# Patient Record
Sex: MALE
[De-identification: names, ages, dates, MRNs, and addresses within clinical notes are randomized per-mention and may not be internally consistent; named-entity substitution may affect disease eponyms.]

---

## 2018-01-26 ENCOUNTER — TRANSCRIPTION ENCOUNTER (OUTPATIENT)
Age: 68
End: 2018-01-26

## 2018-02-18 ENCOUNTER — TRANSCRIPTION ENCOUNTER (OUTPATIENT)
Age: 68
End: 2018-02-18

## 2019-06-16 ENCOUNTER — TRANSCRIPTION ENCOUNTER (OUTPATIENT)
Age: 69
End: 2019-06-16

## 2025-01-28 RX ORDER — CEFUROXIME AXETIL 500 MG
1 TABLET ORAL
Refills: 0 | DISCHARGE
Start: 2025-01-28 | End: 2025-02-01

## 2025-02-05 ENCOUNTER — EMERGENCY (EMERGENCY)
Facility: HOSPITAL | Age: 75
LOS: 0 days | Discharge: HOME | End: 2025-02-05
Attending: EMERGENCY MEDICINE

## 2025-02-05 ENCOUNTER — INPATIENT (INPATIENT)
Facility: HOSPITAL | Age: 75
LOS: 1 days | Discharge: HOME | DRG: 696 | End: 2025-02-07
Attending: FAMILY MEDICINE | Admitting: FAMILY MEDICINE
Payer: MEDICARE

## 2025-02-05 VITALS
OXYGEN SATURATION: 100 % | TEMPERATURE: 98 F | SYSTOLIC BLOOD PRESSURE: 109 MMHG | RESPIRATION RATE: 20 BRPM | DIASTOLIC BLOOD PRESSURE: 64 MMHG | HEIGHT: 66 IN | HEART RATE: 73 BPM

## 2025-02-05 VITALS — DIASTOLIC BLOOD PRESSURE: 66 MMHG | HEART RATE: 80 BPM | SYSTOLIC BLOOD PRESSURE: 119 MMHG

## 2025-02-05 VITALS
OXYGEN SATURATION: 97 % | TEMPERATURE: 97 F | SYSTOLIC BLOOD PRESSURE: 145 MMHG | RESPIRATION RATE: 18 BRPM | HEIGHT: 66 IN | WEIGHT: 141.98 LBS | HEART RATE: 96 BPM | DIASTOLIC BLOOD PRESSURE: 79 MMHG

## 2025-02-05 DIAGNOSIS — E78.5 HYPERLIPIDEMIA, UNSPECIFIED: ICD-10-CM

## 2025-02-05 DIAGNOSIS — R31.9 HEMATURIA, UNSPECIFIED: ICD-10-CM

## 2025-02-05 DIAGNOSIS — R33.9 RETENTION OF URINE, UNSPECIFIED: ICD-10-CM

## 2025-02-05 DIAGNOSIS — N40.1 BENIGN PROSTATIC HYPERPLASIA WITH LOWER URINARY TRACT SYMPTOMS: ICD-10-CM

## 2025-02-05 DIAGNOSIS — R33.8 OTHER RETENTION OF URINE: ICD-10-CM

## 2025-02-05 DIAGNOSIS — Z88.2 ALLERGY STATUS TO SULFONAMIDES: ICD-10-CM

## 2025-02-05 DIAGNOSIS — Z29.9 ENCOUNTER FOR PROPHYLACTIC MEASURES, UNSPECIFIED: ICD-10-CM

## 2025-02-05 DIAGNOSIS — R10.2 PELVIC AND PERINEAL PAIN: ICD-10-CM

## 2025-02-05 LAB
ALBUMIN SERPL ELPH-MCNC: 3.6 G/DL — SIGNIFICANT CHANGE UP (ref 3.3–5)
ALP SERPL-CCNC: 59 U/L — SIGNIFICANT CHANGE UP (ref 40–120)
ALT FLD-CCNC: 25 U/L — SIGNIFICANT CHANGE UP (ref 12–78)
ANION GAP SERPL CALC-SCNC: 4 MMOL/L — LOW (ref 5–17)
ANISOCYTOSIS BLD QL: SLIGHT — SIGNIFICANT CHANGE UP
APPEARANCE UR: ABNORMAL
APPEARANCE UR: ABNORMAL
APTT BLD: 24 SEC — LOW (ref 24.5–35.6)
AST SERPL-CCNC: 22 U/L — SIGNIFICANT CHANGE UP (ref 15–37)
BACTERIA # UR AUTO: ABNORMAL /HPF
BACTERIA # UR AUTO: NEGATIVE /HPF — SIGNIFICANT CHANGE UP
BASOPHILS # BLD AUTO: 0 K/UL — SIGNIFICANT CHANGE UP (ref 0–0.2)
BASOPHILS NFR BLD AUTO: 0 % — SIGNIFICANT CHANGE UP (ref 0–2)
BILIRUB SERPL-MCNC: 0.5 MG/DL — SIGNIFICANT CHANGE UP (ref 0.2–1.2)
BILIRUB UR-MCNC: SIGNIFICANT CHANGE UP
BILIRUB UR-MCNC: SIGNIFICANT CHANGE UP
BLD GP AB SCN SERPL QL: SIGNIFICANT CHANGE UP
BUN SERPL-MCNC: 20 MG/DL — SIGNIFICANT CHANGE UP (ref 7–23)
CALCIUM SERPL-MCNC: 9.3 MG/DL — SIGNIFICANT CHANGE UP (ref 8.5–10.1)
CHLORIDE SERPL-SCNC: 99 MMOL/L — SIGNIFICANT CHANGE UP (ref 96–108)
CO2 SERPL-SCNC: 26 MMOL/L — SIGNIFICANT CHANGE UP (ref 22–31)
COLOR SPEC: ABNORMAL
COLOR SPEC: ABNORMAL
CREAT SERPL-MCNC: 0.93 MG/DL — SIGNIFICANT CHANGE UP (ref 0.5–1.3)
DIFF PNL FLD: SIGNIFICANT CHANGE UP
DIFF PNL FLD: SIGNIFICANT CHANGE UP
EGFR: 86 ML/MIN/1.73M2 — SIGNIFICANT CHANGE UP
EOSINOPHIL # BLD AUTO: 0 K/UL — SIGNIFICANT CHANGE UP (ref 0–0.5)
EOSINOPHIL NFR BLD AUTO: 0 % — SIGNIFICANT CHANGE UP (ref 0–6)
EPI CELLS # UR: SIGNIFICANT CHANGE UP
FLUAV AG NPH QL: SIGNIFICANT CHANGE UP
FLUBV AG NPH QL: SIGNIFICANT CHANGE UP
GLUCOSE BLDC GLUCOMTR-MCNC: 159 MG/DL — HIGH (ref 70–99)
GLUCOSE SERPL-MCNC: 235 MG/DL — HIGH (ref 70–99)
GLUCOSE UR QL: SIGNIFICANT CHANGE UP
GLUCOSE UR QL: SIGNIFICANT CHANGE UP
HCT VFR BLD CALC: 32.9 % — LOW (ref 39–50)
HGB BLD-MCNC: 11.3 G/DL — LOW (ref 13–17)
HYALINE CASTS # UR AUTO: SIGNIFICANT CHANGE UP
HYALINE CASTS # UR AUTO: SIGNIFICANT CHANGE UP
INR BLD: 1.11 RATIO — SIGNIFICANT CHANGE UP (ref 0.85–1.16)
KETONES UR-MCNC: SIGNIFICANT CHANGE UP
KETONES UR-MCNC: SIGNIFICANT CHANGE UP
LACTATE SERPL-SCNC: 1.8 MMOL/L — SIGNIFICANT CHANGE UP (ref 0.7–2)
LACTATE SERPL-SCNC: 2.7 MMOL/L — HIGH (ref 0.7–2)
LEUKOCYTE ESTERASE UR-ACNC: SIGNIFICANT CHANGE UP
LEUKOCYTE ESTERASE UR-ACNC: SIGNIFICANT CHANGE UP
LYMPHOCYTES # BLD AUTO: 0.93 K/UL — LOW (ref 1–3.3)
LYMPHOCYTES # BLD AUTO: 7 % — LOW (ref 13–44)
MACROCYTES BLD QL: SLIGHT — SIGNIFICANT CHANGE UP
MANUAL SMEAR VERIFICATION: SIGNIFICANT CHANGE UP
MCHC RBC-ENTMCNC: 33 PG — SIGNIFICANT CHANGE UP (ref 27–34)
MCHC RBC-ENTMCNC: 34.3 G/DL — SIGNIFICANT CHANGE UP (ref 32–36)
MCV RBC AUTO: 96.2 FL — SIGNIFICANT CHANGE UP (ref 80–100)
MONOCYTES # BLD AUTO: 0.53 K/UL — SIGNIFICANT CHANGE UP (ref 0–0.9)
MONOCYTES NFR BLD AUTO: 4 % — SIGNIFICANT CHANGE UP (ref 2–14)
NEUTROPHILS # BLD AUTO: 11.85 K/UL — HIGH (ref 1.8–7.4)
NEUTROPHILS NFR BLD AUTO: 89 % — HIGH (ref 43–77)
NITRITE UR-MCNC: SIGNIFICANT CHANGE UP
NITRITE UR-MCNC: SIGNIFICANT CHANGE UP
NRBC # BLD: 0 /100 WBCS — SIGNIFICANT CHANGE UP (ref 0–0)
NRBC # BLD: SIGNIFICANT CHANGE UP /100 WBCS (ref 0–0)
NRBC BLD-RTO: 0 /100 WBCS — SIGNIFICANT CHANGE UP (ref 0–0)
NRBC BLD-RTO: SIGNIFICANT CHANGE UP /100 WBCS (ref 0–0)
PH UR: SIGNIFICANT CHANGE UP (ref 5–8)
PH UR: SIGNIFICANT CHANGE UP (ref 5–8)
PLAT MORPH BLD: NORMAL — SIGNIFICANT CHANGE UP
PLATELET # BLD AUTO: 160 K/UL — SIGNIFICANT CHANGE UP (ref 150–400)
POTASSIUM SERPL-MCNC: 3.8 MMOL/L — SIGNIFICANT CHANGE UP (ref 3.5–5.3)
POTASSIUM SERPL-SCNC: 3.8 MMOL/L — SIGNIFICANT CHANGE UP (ref 3.5–5.3)
PROT SERPL-MCNC: 6.4 GM/DL — SIGNIFICANT CHANGE UP (ref 6–8.3)
PROT UR-MCNC: SIGNIFICANT CHANGE UP
PROT UR-MCNC: SIGNIFICANT CHANGE UP
PROTHROM AB SERPL-ACNC: 12.8 SEC — SIGNIFICANT CHANGE UP (ref 9.9–13.4)
RBC # BLD: 3.42 M/UL — LOW (ref 4.2–5.8)
RBC # FLD: 12.8 % — SIGNIFICANT CHANGE UP (ref 10.3–14.5)
RBC BLD AUTO: ABNORMAL
RBC CASTS # UR COMP ASSIST: >50 /HPF — HIGH (ref 0–4)
RBC CASTS # UR COMP ASSIST: ABNORMAL /HPF
RSV RNA NPH QL NAA+NON-PROBE: SIGNIFICANT CHANGE UP
SARS-COV-2 RNA SPEC QL NAA+PROBE: SIGNIFICANT CHANGE UP
SODIUM SERPL-SCNC: 129 MMOL/L — LOW (ref 135–145)
SP GR SPEC: SIGNIFICANT CHANGE UP (ref 1–1.03)
SP GR SPEC: SIGNIFICANT CHANGE UP (ref 1–1.03)
UROBILINOGEN FLD QL: SIGNIFICANT CHANGE UP (ref 0.2–1)
UROBILINOGEN FLD QL: SIGNIFICANT CHANGE UP (ref 0.2–1)
WBC # BLD: 13.32 K/UL — HIGH (ref 3.8–10.5)
WBC # FLD AUTO: 13.32 K/UL — HIGH (ref 3.8–10.5)
WBC UR QL: 14 /HPF — HIGH (ref 0–5)
WBC UR QL: SIGNIFICANT CHANGE UP /HPF (ref 0–5)

## 2025-02-05 PROCEDURE — 80048 BASIC METABOLIC PNL TOTAL CA: CPT

## 2025-02-05 PROCEDURE — 80053 COMPREHEN METABOLIC PANEL: CPT

## 2025-02-05 PROCEDURE — 85027 COMPLETE CBC AUTOMATED: CPT

## 2025-02-05 PROCEDURE — 99222 1ST HOSP IP/OBS MODERATE 55: CPT

## 2025-02-05 PROCEDURE — 99285 EMERGENCY DEPT VISIT HI MDM: CPT

## 2025-02-05 PROCEDURE — 99284 EMERGENCY DEPT VISIT MOD MDM: CPT

## 2025-02-05 PROCEDURE — 71045 X-RAY EXAM CHEST 1 VIEW: CPT | Mod: 26

## 2025-02-05 PROCEDURE — 93010 ELECTROCARDIOGRAM REPORT: CPT

## 2025-02-05 PROCEDURE — 36415 COLL VENOUS BLD VENIPUNCTURE: CPT

## 2025-02-05 PROCEDURE — 76770 US EXAM ABDO BACK WALL COMP: CPT

## 2025-02-05 PROCEDURE — 83036 HEMOGLOBIN GLYCOSYLATED A1C: CPT

## 2025-02-05 PROCEDURE — 83735 ASSAY OF MAGNESIUM: CPT

## 2025-02-05 PROCEDURE — 84100 ASSAY OF PHOSPHORUS: CPT

## 2025-02-05 PROCEDURE — 85025 COMPLETE CBC W/AUTO DIFF WBC: CPT

## 2025-02-05 RX ORDER — BACTERIOSTATIC SODIUM CHLORIDE 0.9 %
1000 VIAL (ML) INJECTION ONCE
Refills: 0 | Status: COMPLETED | OUTPATIENT
Start: 2025-02-05 | End: 2025-02-05

## 2025-02-05 RX ORDER — ATORVASTATIN CALCIUM 80 MG/1
1 TABLET, FILM COATED ORAL
Refills: 0 | DISCHARGE

## 2025-02-05 RX ORDER — SODIUM CHLORIDE 9 G/ML
1000 INJECTION, SOLUTION INTRAVENOUS
Refills: 0 | Status: DISCONTINUED | OUTPATIENT
Start: 2025-02-05 | End: 2025-02-07

## 2025-02-05 RX ORDER — ATORVASTATIN CALCIUM 80 MG/1
20 TABLET, FILM COATED ORAL AT BEDTIME
Refills: 0 | Status: DISCONTINUED | OUTPATIENT
Start: 2025-02-05 | End: 2025-02-07

## 2025-02-05 RX ORDER — ASPIRIN 81 MG/1
81 TABLET, COATED ORAL DAILY
Refills: 0 | Status: DISCONTINUED | OUTPATIENT
Start: 2025-02-05 | End: 2025-02-07

## 2025-02-05 RX ORDER — ACETAMINOPHEN 160 MG/5ML
650 SUSPENSION ORAL EVERY 6 HOURS
Refills: 0 | Status: DISCONTINUED | OUTPATIENT
Start: 2025-02-05 | End: 2025-02-07

## 2025-02-05 RX ORDER — CEFTRIAXONE 250 MG/1
1000 INJECTION, POWDER, FOR SOLUTION INTRAMUSCULAR; INTRAVENOUS EVERY 24 HOURS
Refills: 0 | Status: DISCONTINUED | OUTPATIENT
Start: 2025-02-06 | End: 2025-02-07

## 2025-02-05 RX ORDER — CEFTRIAXONE 250 MG/1
1000 INJECTION, POWDER, FOR SOLUTION INTRAMUSCULAR; INTRAVENOUS ONCE
Refills: 0 | Status: COMPLETED | OUTPATIENT
Start: 2025-02-05 | End: 2025-02-05

## 2025-02-05 RX ORDER — ACETAMINOPHEN, DIPHENHYDRAMINE HCL, PHENYLEPHRINE HCL 325; 25; 5 MG/1; MG/1; MG/1
3 TABLET ORAL AT BEDTIME
Refills: 0 | Status: DISCONTINUED | OUTPATIENT
Start: 2025-02-05 | End: 2025-02-07

## 2025-02-05 RX ORDER — TAMSULOSIN HYDROCHLORIDE 0.4 MG/1
1 CAPSULE ORAL
Refills: 0 | DISCHARGE

## 2025-02-05 RX ORDER — CEFTRIAXONE 250 MG/1
1000 INJECTION, POWDER, FOR SOLUTION INTRAMUSCULAR; INTRAVENOUS ONCE
Refills: 0 | Status: DISCONTINUED | OUTPATIENT
Start: 2025-02-05 | End: 2025-02-05

## 2025-02-05 RX ORDER — ONDANSETRON 4 MG/1
4 TABLET, ORALLY DISINTEGRATING ORAL EVERY 8 HOURS
Refills: 0 | Status: DISCONTINUED | OUTPATIENT
Start: 2025-02-05 | End: 2025-02-07

## 2025-02-05 RX ORDER — TAMSULOSIN HYDROCHLORIDE 0.4 MG/1
0.4 CAPSULE ORAL AT BEDTIME
Refills: 0 | Status: DISCONTINUED | OUTPATIENT
Start: 2025-02-05 | End: 2025-02-07

## 2025-02-05 RX ORDER — MAGNESIUM, ALUMINUM HYDROXIDE 200-225/5
30 SUSPENSION, ORAL (FINAL DOSE FORM) ORAL EVERY 4 HOURS
Refills: 0 | Status: DISCONTINUED | OUTPATIENT
Start: 2025-02-05 | End: 2025-02-07

## 2025-02-05 RX ORDER — ACETAMINOPHEN 160 MG/5ML
975 SUSPENSION ORAL ONCE
Refills: 0 | Status: COMPLETED | OUTPATIENT
Start: 2025-02-05 | End: 2025-02-05

## 2025-02-05 RX ORDER — HEPARIN SODIUM,PORCINE 10000/ML
5000 VIAL (ML) INJECTION EVERY 12 HOURS
Refills: 0 | Status: DISCONTINUED | OUTPATIENT
Start: 2025-02-05 | End: 2025-02-07

## 2025-02-05 RX ADMIN — ACETAMINOPHEN 650 MILLIGRAM(S): 160 SUSPENSION ORAL at 19:48

## 2025-02-05 RX ADMIN — SODIUM CHLORIDE 100 MILLILITER(S): 9 INJECTION, SOLUTION INTRAVENOUS at 21:08

## 2025-02-05 RX ADMIN — CEFTRIAXONE 1000 MILLIGRAM(S): 250 INJECTION, POWDER, FOR SOLUTION INTRAMUSCULAR; INTRAVENOUS at 16:30

## 2025-02-05 RX ADMIN — Medication 1000 MILLILITER(S): at 12:54

## 2025-02-05 RX ADMIN — ACETAMINOPHEN 975 MILLIGRAM(S): 160 SUSPENSION ORAL at 14:25

## 2025-02-05 RX ADMIN — TAMSULOSIN HYDROCHLORIDE 0.4 MILLIGRAM(S): 0.4 CAPSULE ORAL at 21:08

## 2025-02-05 RX ADMIN — ATORVASTATIN CALCIUM 20 MILLIGRAM(S): 80 TABLET, FILM COATED ORAL at 21:08

## 2025-02-05 RX ADMIN — Medication 5 MILLIGRAM(S): at 21:08

## 2025-02-05 NOTE — PATIENT PROFILE ADULT - FALL HARM RISK - HARM RISK INTERVENTIONS

## 2025-02-05 NOTE — H&P ADULT - NSHPLABSRESULTS_GEN_ALL_CORE
LABS:  cret                        11.3   13.32 )-----------( 160      ( 05 Feb 2025 12:52 )             32.9     02-05    129[L]  |  99  |  20  ----------------------------<  235[H]  3.8   |  26  |  0.93    Ca    9.3      05 Feb 2025 12:52    TPro  6.4  /  Alb  3.6  /  TBili  0.5  /  DBili  x   /  AST  22  /  ALT  25  /  AlkPhos  59  02-05    PT/INR - ( 05 Feb 2025 12:52 )   PT: 12.8 sec;   INR: 1.11 ratio         PTT - ( 05 Feb 2025 12:52 )  PTT:24.0 sec      Urinalysis with Rflx Culture (collected 02-05-25 @ 14:51)    Urinalysis with Rflx Culture (collected 02-05-25 @ 07:40)    < from: Xray Chest 1 View- PORTABLE-Routine (Xray Chest 1 View- PORTABLE-Routine .) (02.05.25 @ 15:43) >      IMPRESSION: Clear lungs with no acute cardiopulmonary abnormalities.    --- End of Report ---    < end of copied text >

## 2025-02-05 NOTE — ED ADULT TRIAGE NOTE - CHIEF COMPLAINT QUOTE
Pt ambulatory to ED w c/o urinary retention since 11pm. States he had Urolift procedure 1 week ago and last night started to develop bright red blood in urine with clots. Pt denies abdominal pain, fever, chills. States it feels like he needs to void and feels distended.

## 2025-02-05 NOTE — ED PROVIDER NOTE - PATIENT PORTAL LINK FT
You can access the FollowMyHealth Patient Portal offered by [facility] by registering at the following website: [email or web address]. By joining [facility]’s FollowMyHealth portal, you will also be able to view your health information using other applications (apps) compatible with our system.

## 2025-02-05 NOTE — PATIENT PROFILE ADULT - FALL HARM RISK - ATTEMPT OOB
Post-Op Assessment Note    CV Status:  Stable  Pain Score: 0    Pain management: adequate     Mental Status:  Awake   Hydration Status:  Stable   PONV Controlled:  Controlled   Airway Patency:  Patent      Post Op Vitals Reviewed: Yes      Staff: CRNA         No complications documented      BP   106/52   Temp     Pulse  71   Resp   12   SpO2   100
No

## 2025-02-05 NOTE — H&P ADULT - HISTORY OF PRESENT ILLNESS
74 w/ h/o BPH s/p uro-lift 1 week ago presents with hematuria.   74 w/ h/o BPH s/p uro-lift 1 week ago presents with hematuria. Patient reports having difficulty urinating, and blood clots since the procedure. Was seen at urology office and was dizzy and hypotensive and sent to ED. In ED VSS, 3 way foster placed in ED, and started on CBI. Admitted to  for further care.

## 2025-02-05 NOTE — ED ADULT NURSE REASSESSMENT NOTE - NS ED NURSE REASSESS COMMENT FT1
some blood clots noted in foster bag. Bladder scan performed showed zero ml in bladder. MD aware. CBI restarted per MD orders.

## 2025-02-05 NOTE — ED PROVIDER NOTE - CLINICAL SUMMARY MEDICAL DECISION MAKING FREE TEXT BOX
74-year-old male with history of BPH, hyperlipidemia status post UroLift procedure 8 days ago that was initially complicated with gross hematuria and urinary retention immediately after the procedure requiring CBI at [facility] presents for recurrent urinary retention with gross hematuria.  Patient states that after his initial CBI 8 days ago, his urine had cleared up and then for a few hours he noted gross hematuria which subsequently cleared up prior to reappearing last night.  Patient states that he is been unable to void urine over the last few hours and only blood clots are coming out of his urethra.  Patient notes suprapubic distention and discomfort.  His urologist is Dr. Keller.  I spoke with his urologist after placement of Ramirez catheter and he states that if his urine is free-flowing after a brief CBI, the patient may be discharged to follow-up with him in the office.  States his office opens at 9 AM.

## 2025-02-05 NOTE — ED STATDOCS - PROGRESS NOTE DETAILS
Trevor Mendoza Attcharan for Dr. Fuentes : 74-year-old male was in  ED earlier today,  had a urinary foster catheter placed with CBI, discharged from  ED 1 hour ago. After discharge, pt went to urologist. At urology office, pt felt lightheaded , chills, dizzy , reportedly hypotensive and then was told to come back to ED. Will return to main ED.

## 2025-02-05 NOTE — H&P ADULT - PROBLEM SELECTOR PLAN 1
#BPH s/p Uro-lift  #UTI  #Hematuria  -pt with 3-way foster, c/w CBI as per urology  -Started on ceftriaxone, f/u cultures  -f/u urology recs.

## 2025-02-05 NOTE — ED ADULT NURSE NOTE - NSFALLHARMRISKINTERV_ED_ALL_ED

## 2025-02-05 NOTE — ED ADULT NURSE NOTE - OBJECTIVE STATEMENT
Pt presents to ED c/o hypotension. pt was discharged from ED this morning for hematuria. S/P CBI. Hematuria persists. pt reports after being discharged, he went to urologist office and found to be hypotensive in the 80s and felt dizzy. pt reports dizziness continues. denies chest pain, denies SOB. 3way foster patent.

## 2025-02-05 NOTE — ED PROVIDER NOTE - PHYSICAL EXAMINATION
Constitutional: NAD, alert, verbal  HEENT: NCAT, EOMi, PERRL  Cardiac: RRR no MRG  Resp: clear, no wheezing or crackles  GI: ab soft ntnd, no r/g  MSK/Ext: no edema  Neuro: DAVILA  Skin: No rashes Constitutional: NAD, alert, verbal  Cardiac: RRR no MRG  Resp: clear, no wheezing or crackles  GI: mild tender suprapubicly  : foster draining blood-tinged urine  Neuro: DAVILA  Skin: No rashes

## 2025-02-05 NOTE — ED PROVIDER NOTE - NSFOLLOWUPINSTRUCTIONS_ED_ALL_ED_FT
Follow up with Dr. Keller immediately.     Urinary Retention    Urinary retention is the inability to completely empty your bladder. This is a common problem in older men, especially with enlarged prostates. If you are sent home with a foster catheter and a drainage system make sure to keep the drainage bag emptied and lower than your catheter. Keep the foster catheter in until you follow up with a urologist.    SEEK IMMEDIATE MEDICAL CARE IF YOU DEVELOP THE FOLLOWING SYMPTOMS: the catheter stops draining urine, the catheter falls out, abdominal pain, nausea/vomiting, or chills/fever.

## 2025-02-05 NOTE — ED ADULT TRIAGE NOTE - CHIEF COMPLAINT QUOTE
PT presents to er with complaints of hematuria, states he was here earlier today and had a urinary catheter placed with CBI, states he lost a large amount of blood, denies fevers, pt went to his urologist after being d/c from here and was instructed to come back for further evaluation.

## 2025-02-05 NOTE — PHARMACOTHERAPY INTERVENTION NOTE - COMMENTS
Medication reconciliation completed.  Reviewed Medication list and confirmed med allergies with patient; confirmed with Dr. First Medtyler.

## 2025-02-05 NOTE — CONSULT NOTE ADULT - SUBJECTIVE AND OBJECTIVE BOX
74-year-old male was in  ED earlier today,  had a urinary foster catheter placed with CBI, discharged from  ED 1 hour ago. After discharge, pt went to urologist. At urology office, pt felt lightheaded , chills, dizzy , reportedly hypotensive and then was told to come back to ED. Will return to main ED.    Called to see this 73 y/o male for hematuria.  Pt had Uro-lift about a week ago.  Had to have CBI after procedure then sent home without foster.  This past Sunday pt had hematuria but drank fluids and urine cleared up.  This morning pt had problems voiding and came to ER.  22 Fr 3 way foster placed and had CBI for a couple of hours.  Pt went to Dr. Keller's office and got hypotensive and came back to [facility] ER.  Pt re-started on CBI because urine was bloody again.  Pt denies fever, chills, N/V or any other complaints.      PAST MEDICAL/SURGICAL/FAMILY/SOCIAL HISTORY:    Past Medical, Past Surgical, and Family History:  PAST MEDICAL HISTORY:  BPH with urinary obstruction     Hyperlipidemia.    ALLERGIES AND HOME MEDICATIONS:   Allergies:        Allergies:  	Bactrim: Drug, Unknown    Home Medications:   * Outpatient Medication Status not yet specified    ROS are otherwise negative unless noted in the HPI    PHYSICAL EXAM:   · Physical Examination: CONSTITUTIONAL: Well appearing, awake, alert, oriented to person, place, time/situation and in no apparent distress.  	· ENMT: Airway patent, Nasal mucosa clear. Mouth with normal mucosa. Throat has no vesicles, no oropharyngeal exudates and uvula is midline.  	· EYES: Clear bilaterally, pupils equal, round and reactive to light.  	· CARDIAC: Normal rate, regular rhythm.  Heart sounds S1, S2.  No murmurs, rubs or gallops.  	· RESPIRATORY: Breath sounds clear and equal bilaterally.  	· GASTROINTESTINAL: Abdomen soft, Suprapubic distention with tenderness to palpation  	: Dark red blood at meatus  	· MUSCULOSKELETAL: Spine appears normal, range of motion is not limited, no muscle or joint tenderness  	· NEUROLOGICAL: Alert and oriented, no focal deficits, no motor or sensory deficits.  · SKIN: Skin normal color for race, warm, dry and intact. No evidence of rash      LABS:                          11.3   13.32 )-----------( 160      ( 05 Feb 2025 12:52 )             32.9     02-05    129[L]  |  99  |  20  ----------------------------<  235[H]  3.8   |  26  |  0.93    Ca    9.3      05 Feb 2025 12:52    TPro  6.4  /  Alb  3.6  /  TBili  0.5  /  DBili  x   /  AST  22  /  ALT  25  /  AlkPhos  59  02-05    LIVER FUNCTIONS - ( 05 Feb 2025 12:52 )  Alb: 3.6 g/dL / Pro: 6.4 gm/dL / ALK PHOS: 59 U/L / ALT: 25 U/L / AST: 22 U/L / GGT: x           PT/INR - ( 05 Feb 2025 12:52 )   PT: 12.8 sec;   INR: 1.11 ratio         PTT - ( 05 Feb 2025 12:52 )  PTT:24.0 sec  Urinalysis Basic - ( 05 Feb 2025 14:51 )    Color: Red / Appearance: Turbid / SG: see note / pH: x  Gluc: x / Ketone: See Note  / Bili: See Note / Urobili: See Note   Blood: x / Protein: See Note / Nitrite: See Note   Leuk Esterase: See Note / RBC: Too Numerous to count /HPF / WBC 14 /HPF   Sq Epi: x / Non Sq Epi: x / Bacteria: Many /HPF

## 2025-02-05 NOTE — ED ADULT NURSE NOTE - OBJECTIVE STATEMENT
Pt ambulatory to ED w c/o urinary retention since 11pm. States he had Urolift procedure 1 week ago and last night started to develop bright red blood in urine with clots. pt reports his abdomen feels distended. Pt denies CP/SOB, N/V/D, fever/chills, dizziness, pain. no other complaints at this time. safety and comfort measures maintained. md at bedside

## 2025-02-05 NOTE — H&P ADULT - NSHPPHYSICALEXAM_GEN_ALL_CORE
T(C): 36.6 (02-05-25 @ 19:13), Max: 36.7 (02-05-25 @ 18:40)  HR: 76 (02-05-25 @ 19:13) (56 - 96)  BP: 135/61 (02-05-25 @ 19:13) (100/57 - 145/79)  RR: 18 (02-05-25 @ 19:13) (16 - 20)  SpO2: 97% (02-05-25 @ 19:13) (97% - 100%)    General: non-toxic  HEENT: non-traumatic, perrla, eomi  Cardio: s1s2 regular rate and rhythm  Lungs: comfortable breathing, clear to auscultation  Abdomen: Soft, non-tender, non-distended  Neuro: AOx4  Ext: Pulses +2

## 2025-02-05 NOTE — ED PROVIDER NOTE - PHYSICAL EXAMINATION
CONSTITUTIONAL: Well appearing, awake, alert, oriented to person, place, time/situation and in no apparent distress.  · ENMT: Airway patent, Nasal mucosa clear. Mouth with normal mucosa. Throat has no vesicles, no oropharyngeal exudates and uvula is midline.  · EYES: Clear bilaterally, pupils equal, round and reactive to light.  · CARDIAC: Normal rate, regular rhythm.  Heart sounds S1, S2.  No murmurs, rubs or gallops.  · RESPIRATORY: Breath sounds clear and equal bilaterally.  · GASTROINTESTINAL: Abdomen soft, Suprapubic distention with tenderness to palpation  : Dark red blood at meatus  · MUSCULOSKELETAL: Spine appears normal, range of motion is not limited, no muscle or joint tenderness  · NEUROLOGICAL: Alert and oriented, no focal deficits, no motor or sensory deficits.  · SKIN: Skin normal color for race, warm, dry and intact. No evidence of rash

## 2025-02-05 NOTE — ED PROVIDER NOTE - OBJECTIVE STATEMENT
74-year-old male with history of BPH, hyperlipidemia status post UroLift procedure 8 days ago that was initially complicated with gross hematuria and urinary retention immediately after the procedure requiring CBI at [facility] presents for recurrent urinary retention with gross hematuria.  Patient states that after his initial CBI 8 days ago, his urine had cleared up and then for a few hours he noted gross hematuria which subsequently cleared up prior to reappearing last night.  Patient states that he is been unable to void urine over the last few hours and only blood clots are coming out of his urethra.  Patient notes suprapubic distention and discomfort.  His urologist is Dr. Keller.  I spoke with his urologist after placement of Ramirez catheter and he states that if his urine is free-flowing after a brief CBI, the patient may be discharged to follow-up with him in the office.  States his office opens at 9 AM.

## 2025-02-05 NOTE — ED PROVIDER NOTE - CLINICAL SUMMARY MEDICAL DECISION MAKING FREE TEXT BOX
75 y/o male with urolift 8 days ago by Dr. Keller returning to the ER for hypotension at OP office. Patient was here earlier this morning for retention and blood clots. Given CBI with good clearance and discharged with same day outpatient f/u per Dr. Keller. There he was found to be hypotensive and lightheaded so was told to return to the ER. ROS otherwise neg.     VS wnl  Exam nl    Hypotension  fluids, labs, cultures, monitor, reassess 75 y/o male with urolift 8 days ago by Dr. Keller returning to the ER for hypotension at OP office. Patient was here earlier this morning for retention and blood clots. Given CBI with good clearance and discharged with same day outpatient f/u per Dr. Keller. There he was found to be hypotensive and lightheaded so was told to return to the ER. ROS otherwise neg.     VS wnl  NAD, mild suprepubic tenderness    Hypotension  fluids, labs, cultures, monitor, reassess 73 y/o male with urolift 8 days ago by Dr. Keller returning to the ER for hypotension at OP office. Patient was here earlier this morning for retention and blood clots. Given CBI with good clearance and discharged with same day outpatient f/u per Dr. Keller. There he was found to be hypotensive and lightheaded so was told to return to the ER. ROS otherwise neg.     VS wnl  NAD, mild suprepubic tenderness    Hypotension now improved. Ddx hypotension 2/2 hypovolemia. Dehydration vs acute blood loss. r/o anemia. r/o infection  fluids, labs, cultures, monitor, reassess

## 2025-02-06 LAB
ADD ON TEST-SPECIMEN IN LAB: SIGNIFICANT CHANGE UP
ALBUMIN SERPL ELPH-MCNC: 3 G/DL — LOW (ref 3.3–5)
ALP SERPL-CCNC: 44 U/L — SIGNIFICANT CHANGE UP (ref 40–120)
ALT FLD-CCNC: 26 U/L — SIGNIFICANT CHANGE UP (ref 12–78)
ANION GAP SERPL CALC-SCNC: 0 MMOL/L — LOW (ref 5–17)
AST SERPL-CCNC: 15 U/L — SIGNIFICANT CHANGE UP (ref 15–37)
BASOPHILS # BLD AUTO: 0.01 K/UL — SIGNIFICANT CHANGE UP (ref 0–0.2)
BASOPHILS NFR BLD AUTO: 0.1 % — SIGNIFICANT CHANGE UP (ref 0–2)
BILIRUB SERPL-MCNC: 0.4 MG/DL — SIGNIFICANT CHANGE UP (ref 0.2–1.2)
BUN SERPL-MCNC: 10 MG/DL — SIGNIFICANT CHANGE UP (ref 7–23)
CALCIUM SERPL-MCNC: 9.2 MG/DL — SIGNIFICANT CHANGE UP (ref 8.5–10.1)
CHLORIDE SERPL-SCNC: 111 MMOL/L — HIGH (ref 96–108)
CO2 SERPL-SCNC: 29 MMOL/L — SIGNIFICANT CHANGE UP (ref 22–31)
CREAT SERPL-MCNC: 0.79 MG/DL — SIGNIFICANT CHANGE UP (ref 0.5–1.3)
CULTURE RESULTS: NO GROWTH — SIGNIFICANT CHANGE UP
EGFR: 93 ML/MIN/1.73M2 — SIGNIFICANT CHANGE UP
EOSINOPHIL # BLD AUTO: 0.03 K/UL — SIGNIFICANT CHANGE UP (ref 0–0.5)
EOSINOPHIL NFR BLD AUTO: 0.4 % — SIGNIFICANT CHANGE UP (ref 0–6)
GLUCOSE SERPL-MCNC: 95 MG/DL — SIGNIFICANT CHANGE UP (ref 70–99)
HCT VFR BLD CALC: 29.4 % — LOW (ref 39–50)
HGB BLD-MCNC: 10 G/DL — LOW (ref 13–17)
IMM GRANULOCYTES NFR BLD AUTO: 0.4 % — SIGNIFICANT CHANGE UP (ref 0–0.9)
LYMPHOCYTES # BLD AUTO: 1.77 K/UL — SIGNIFICANT CHANGE UP (ref 1–3.3)
LYMPHOCYTES # BLD AUTO: 26.2 % — SIGNIFICANT CHANGE UP (ref 13–44)
MAGNESIUM SERPL-MCNC: 1.9 MG/DL — SIGNIFICANT CHANGE UP (ref 1.6–2.6)
MCHC RBC-ENTMCNC: 33 PG — SIGNIFICANT CHANGE UP (ref 27–34)
MCHC RBC-ENTMCNC: 34 G/DL — SIGNIFICANT CHANGE UP (ref 32–36)
MCV RBC AUTO: 97 FL — SIGNIFICANT CHANGE UP (ref 80–100)
MONOCYTES # BLD AUTO: 0.46 K/UL — SIGNIFICANT CHANGE UP (ref 0–0.9)
MONOCYTES NFR BLD AUTO: 6.8 % — SIGNIFICANT CHANGE UP (ref 2–14)
NEUTROPHILS # BLD AUTO: 4.45 K/UL — SIGNIFICANT CHANGE UP (ref 1.8–7.4)
NEUTROPHILS NFR BLD AUTO: 66.1 % — SIGNIFICANT CHANGE UP (ref 43–77)
PHOSPHATE SERPL-MCNC: 2.5 MG/DL — SIGNIFICANT CHANGE UP (ref 2.5–4.5)
PLATELET # BLD AUTO: 146 K/UL — LOW (ref 150–400)
POTASSIUM SERPL-MCNC: 4.1 MMOL/L — SIGNIFICANT CHANGE UP (ref 3.5–5.3)
POTASSIUM SERPL-SCNC: 4.1 MMOL/L — SIGNIFICANT CHANGE UP (ref 3.5–5.3)
PROT SERPL-MCNC: 5.4 GM/DL — LOW (ref 6–8.3)
RBC # BLD: 3.03 M/UL — LOW (ref 4.2–5.8)
RBC # FLD: 13.3 % — SIGNIFICANT CHANGE UP (ref 10.3–14.5)
SODIUM SERPL-SCNC: 140 MMOL/L — SIGNIFICANT CHANGE UP (ref 135–145)
SPECIMEN SOURCE: SIGNIFICANT CHANGE UP
WBC # BLD: 6.75 K/UL — SIGNIFICANT CHANGE UP (ref 3.8–10.5)
WBC # FLD AUTO: 6.75 K/UL — SIGNIFICANT CHANGE UP (ref 3.8–10.5)

## 2025-02-06 PROCEDURE — 99232 SBSQ HOSP IP/OBS MODERATE 35: CPT

## 2025-02-06 PROCEDURE — 76770 US EXAM ABDO BACK WALL COMP: CPT | Mod: 26

## 2025-02-06 PROCEDURE — 99233 SBSQ HOSP IP/OBS HIGH 50: CPT

## 2025-02-06 RX ADMIN — ATORVASTATIN CALCIUM 20 MILLIGRAM(S): 80 TABLET, FILM COATED ORAL at 21:21

## 2025-02-06 RX ADMIN — Medication 2000 UNIT(S): at 21:21

## 2025-02-06 RX ADMIN — ACETAMINOPHEN 650 MILLIGRAM(S): 160 SUSPENSION ORAL at 01:20

## 2025-02-06 RX ADMIN — TAMSULOSIN HYDROCHLORIDE 0.4 MILLIGRAM(S): 0.4 CAPSULE ORAL at 21:20

## 2025-02-06 RX ADMIN — Medication 5 MILLIGRAM(S): at 21:21

## 2025-02-06 RX ADMIN — ACETAMINOPHEN 650 MILLIGRAM(S): 160 SUSPENSION ORAL at 23:24

## 2025-02-06 NOTE — PROGRESS NOTE ADULT - SUBJECTIVE AND OBJECTIVE BOX
Patient seen at bedside. Patient reports he had a couple of episodes of clot retention overnight requiring manual irrigation to the clots. Urine has been clear since then on fast drip CBI. Patient denies any abd/flank pain.    General: No distress, No anxiety  VITALS  T(C): 36.7 (02-06-25 @ 08:26), Max: 36.7 (02-05-25 @ 18:40)  HR: 77 (02-06-25 @ 08:26) (68 - 77)  BP: 107/56 (02-06-25 @ 08:26) (105/64 - 135/61)  RR: 18 (02-06-25 @ 08:26) (16 - 20)  SpO2: 98% (02-06-25 @ 08:26) (97% - 100%)            Skin     : No jaundice, No lesions, No swelling  HEENT: Normocephalic, no icterus , EOM full , No epistaxis  Lung    : No resp distress  Abdo:   : Soft, Non tender, No guarding, No distension   Back    : No CVAT b/l  Extremity: No calf tenderness   Genitalia Male: No Ramirez, CBI ? , No scrotal swelling, No testicular swelling. No erythema, No crepitation, No induration, No Phimosis  CHRIS     :  no mass, approx size _______    Genitalia Female: No Ramirez  Neuro   : A&Ox3   Patient seen at bedside. Patient reports he had a couple of episodes of clot retention overnight requiring manual irrigation to the clots. Urine has been clear since then on fast drip CBI. Patient denies any abd/flank pain.    General: No distress, No anxiety  VITALS  T(C): 36.7 (02-06-25 @ 08:26), Max: 36.7 (02-05-25 @ 18:40)  HR: 77 (02-06-25 @ 08:26) (68 - 77)  BP: 107/56 (02-06-25 @ 08:26) (105/64 - 135/61)  RR: 18 (02-06-25 @ 08:26) (16 - 20)  SpO2: 98% (02-06-25 @ 08:26) (97% - 100%)            Skin     : No jaundice  HEENT: Normocephalic, no icterus , EOM full , No epistaxis  Lung    : No resp distress  Abdo:   : Soft, Non tender, No guarding, No distension   Back    : No CVAT b/l  Genitalia Male:  Ramirez with CBI on fast drip draining clear urine no clots.  Neuro   : A&Ox3      LABS                        10.0   6.75  )-----------( 146      ( 06 Feb 2025 07:37 )             29.4   02-06    140  |  111[H]  |  10  ----------------------------<  95  4.1   |  29  |  0.79    Ca    9.2      06 Feb 2025 07:37  Phos  2.5     02-06  Mg     1.9     02-06    TPro  5.4[L]  /  Alb  3.0[L]  /  TBili  0.4  /  DBili  x   /  AST  15  /  ALT  26  /  AlkPhos  44  02-06

## 2025-02-06 NOTE — PROGRESS NOTE ADULT - ASSESSMENT
74 w/ h/o BPH s/p uro-lift 1 week ago presents with hematuria. Patient reports having difficulty urinating, and blood clots since the procedure. Was seen at urology office and was dizzy and hypotensive and sent to ED. In ED VSS, 3 way foster placed in ED, and started on CBI. Admitted to  for further care.    # Hematuria sp Urolift  # UTI  - Continue CBI as per urology  - Continue rocephin  - FU cultures  - Bladder and kidney US    # DVT prophylaxis  [facility] 
73 y/o male admitted with hematuria/ clot retention. Pt s/p Urolift with his urologist Dr. Keller about a week ago.  Slowed down CBI this am as urine was clear on fast drip CBI  Recommend  - Keep CBI on slow drip titrate to clear light pink tinge, hand irrigate PRN- If patient leaks around catheter, clots in drainage tube, catheter not draining or if patient complains of abdominal pain.   - Continue antibiotics, adjust as per cultures/ sensitivities    - Trend H/H, transfuse as needed  - If pt's urine does not clear up, then can consider renal/bladder sonogram to determine clot burden    Case discussed with Dr. Nunez

## 2025-02-06 NOTE — PROGRESS NOTE ADULT - SUBJECTIVE AND OBJECTIVE BOX
HOSPITALIST ATTENDING PROGRESS NOTE    Chart and meds reviewed.  Patient seen and examined.    CC: Hematuria    Subjective: No with foster, still with hematuria    All other systems reviewed and found to be negative with the exception of what has been described above.    MEDICATIONS  (STANDING):  aspirin enteric coated 81 milliGRAM(s) Oral daily  atorvastatin 20 milliGRAM(s) Oral at bedtime  cefTRIAXone Injectable. 1000 milliGRAM(s) IV Push every 24 hours  cholecalciferol 2000 Unit(s) Oral daily  finasteride 5 milliGRAM(s) Oral at bedtime  heparin   Injectable 5000 Unit(s) SubCutaneous every 12 hours  lactated ringers. 1000 milliLiter(s) (100 mL/Hr) IV Continuous <Continuous>  tamsulosin 0.4 milliGRAM(s) Oral at bedtime    MEDICATIONS  (PRN):  acetaminophen     Tablet .. 650 milliGRAM(s) Oral every 6 hours PRN Mild Pain (1 - 3)  aluminum hydroxide/magnesium hydroxide/simethicone Suspension 30 milliLiter(s) Oral every 4 hours PRN Dyspepsia  melatonin 3 milliGRAM(s) Oral at bedtime PRN Insomnia  ondansetron Injectable 4 milliGRAM(s) IV Push every 8 hours PRN Nausea and/or Vomiting      Vital Signs Last 24 Hrs  T(C): 36.7 (06 Feb 2025 08:26), Max: 36.7 (05 Feb 2025 18:40)  T(F): 98 (06 Feb 2025 08:26), Max: 98 (05 Feb 2025 18:40)  HR: 77 (06 Feb 2025 08:26) (68 - 77)  BP: 107/56 (06 Feb 2025 08:26) (105/64 - 135/61)  BP(mean): 88 (05 Feb 2025 14:31) (88 - 88)  RR: 18 (06 Feb 2025 08:26) (16 - 20)  SpO2: 98% (06 Feb 2025 08:26) (97% - 100%)    Parameters below as of 06 Feb 2025 08:26  Patient On (Oxygen Delivery Method): room air    GEN: NAD  HEENT:  pupils equal and reactive, EOMI, no oropharyngeal lesions, erythema, exudates, oral thrush  NECK:   supple, no carotid bruits  CV:  +S1, +S2, regular, no murmurs  RESP:   lungs clear to auscultation bilaterally, no wheezing, rales, rhonchi, good air entry bilaterally  GI:  abdomen soft, non-tender, non-distended, normal BS  EXT:  no clubbing, no cyanosis, no edema, no calf pain, swelling or erythema  NEURO:  AAOX3, no focal neurological deficits, follows all commands, able to move extremities spontaneously  SKIN:  no ulcers, lesions or rashes    LABS:                          10.0   6.75  )-----------( 146      ( 06 Feb 2025 07:37 )             29.4     02-06    140  |  111[H]  |  10  ----------------------------<  95  4.1   |  29  |  0.79    Ca    9.2      06 Feb 2025 07:37  Phos  2.5     02-06  Mg     1.9     02-06    TPro  5.4[L]  /  Alb  3.0[L]  /  TBili  0.4  /  DBili  x   /  AST  15  /  ALT  26  /  AlkPhos  44  02-06    LIVER FUNCTIONS - ( 06 Feb 2025 07:37 )  Alb: 3.0 g/dL / Pro: 5.4 gm/dL / ALK PHOS: 44 U/L / ALT: 26 U/L / AST: 15 U/L / GGT: x           PT/INR - ( 05 Feb 2025 12:52 )   PT: 12.8 sec;   INR: 1.11 ratio    PTT - ( 05 Feb 2025 12:52 )  PTT:24.0 sec    Urinalysis Basic - ( 06 Feb 2025 07:37 )  Color: x / Appearance: x / SG: x / pH: x  Gluc: 95 mg/dL / Ketone: x  / Bili: x / Urobili: x   Blood: x / Protein: x / Nitrite: x   Leuk Esterase: x / RBC: x / WBC x   Sq Epi: x / Non Sq Epi: x / Bacteria: x    Lactate, Blood: 1.8 mmol/L (02-05 @ 15:41)  Lactate, Blood: 2.7 mmol/L (02-05 @ 12:52)

## 2025-02-07 ENCOUNTER — TRANSCRIPTION ENCOUNTER (OUTPATIENT)
Age: 75
End: 2025-02-07

## 2025-02-07 VITALS
TEMPERATURE: 98 F | RESPIRATION RATE: 18 BRPM | DIASTOLIC BLOOD PRESSURE: 62 MMHG | HEART RATE: 65 BPM | SYSTOLIC BLOOD PRESSURE: 112 MMHG | OXYGEN SATURATION: 98 %

## 2025-02-07 DIAGNOSIS — N40.1 BENIGN PROSTATIC HYPERPLASIA WITH LOWER URINARY TRACT SYMPTOMS: ICD-10-CM

## 2025-02-07 LAB
A1C WITH ESTIMATED AVERAGE GLUCOSE RESULT: 4.9 % — SIGNIFICANT CHANGE UP (ref 4–5.6)
ANION GAP SERPL CALC-SCNC: 4 MMOL/L — LOW (ref 5–17)
BUN SERPL-MCNC: 16 MG/DL — SIGNIFICANT CHANGE UP (ref 7–23)
CALCIUM SERPL-MCNC: 9.5 MG/DL — SIGNIFICANT CHANGE UP (ref 8.5–10.1)
CHLORIDE SERPL-SCNC: 110 MMOL/L — HIGH (ref 96–108)
CO2 SERPL-SCNC: 26 MMOL/L — SIGNIFICANT CHANGE UP (ref 22–31)
CREAT SERPL-MCNC: 0.86 MG/DL — SIGNIFICANT CHANGE UP (ref 0.5–1.3)
EGFR: 90 ML/MIN/1.73M2 — SIGNIFICANT CHANGE UP
ESTIMATED AVERAGE GLUCOSE: 94 MG/DL — SIGNIFICANT CHANGE UP (ref 68–114)
GLUCOSE SERPL-MCNC: 92 MG/DL — SIGNIFICANT CHANGE UP (ref 70–99)
HCT VFR BLD CALC: 28.3 % — LOW (ref 39–50)
HGB BLD-MCNC: 9.6 G/DL — LOW (ref 13–17)
MCHC RBC-ENTMCNC: 32.9 PG — SIGNIFICANT CHANGE UP (ref 27–34)
MCHC RBC-ENTMCNC: 33.9 G/DL — SIGNIFICANT CHANGE UP (ref 32–36)
MCV RBC AUTO: 96.9 FL — SIGNIFICANT CHANGE UP (ref 80–100)
PLATELET # BLD AUTO: 136 K/UL — LOW (ref 150–400)
POTASSIUM SERPL-MCNC: 3.8 MMOL/L — SIGNIFICANT CHANGE UP (ref 3.5–5.3)
POTASSIUM SERPL-SCNC: 3.8 MMOL/L — SIGNIFICANT CHANGE UP (ref 3.5–5.3)
RBC # BLD: 2.92 M/UL — LOW (ref 4.2–5.8)
RBC # FLD: 13.3 % — SIGNIFICANT CHANGE UP (ref 10.3–14.5)
SODIUM SERPL-SCNC: 140 MMOL/L — SIGNIFICANT CHANGE UP (ref 135–145)
WBC # BLD: 6.4 K/UL — SIGNIFICANT CHANGE UP (ref 3.8–10.5)
WBC # FLD AUTO: 6.4 K/UL — SIGNIFICANT CHANGE UP (ref 3.8–10.5)

## 2025-02-07 PROCEDURE — 99239 HOSP IP/OBS DSCHRG MGMT >30: CPT

## 2025-02-07 PROCEDURE — 99232 SBSQ HOSP IP/OBS MODERATE 35: CPT

## 2025-02-07 RX ORDER — IRON/FOLIC ACID/C/B6/B12/ZINC 150-1.25MG
1 TABLET ORAL
Refills: 0 | DISCHARGE

## 2025-02-07 RX ORDER — CEFUROXIME AXETIL 500 MG
1 TABLET ORAL
Qty: 10 | Refills: 0
Start: 2025-02-07 | End: 2025-02-11

## 2025-02-07 RX ORDER — ASPIRIN 81 MG/1
1 TABLET, COATED ORAL
Refills: 0 | DISCHARGE

## 2025-02-07 RX ADMIN — ACETAMINOPHEN 650 MILLIGRAM(S): 160 SUSPENSION ORAL at 00:08

## 2025-02-07 NOTE — DISCHARGE NOTE PROVIDER - HOSPITAL COURSE
74 w/ h/o BPH s/p uro-lift 1 week ago presents with hematuria. Patient reports having difficulty urinating, and blood clots since the procedure. Was seen at urology office and was dizzy and hypotensive and sent to ED. In ED VSS, 3 way foster placed in ED, and started on CBI. Admitted to [facility] for further care.    # Hematuria sp Urolift  # UTI  - Continue CBI as per urology  - Continue rocephin  -  negative cultures  - Bladder and kidney US  - DC home with foster    # DVT prophylaxis  - [facility]

## 2025-02-07 NOTE — DISCHARGE NOTE PROVIDER - NSDCCPCAREPLAN_GEN_ALL_CORE_FT
PRINCIPAL DISCHARGE DIAGNOSIS  Diagnosis: Hematuria  Assessment and Plan of Treatment: SP urolift and you developed hematuria. You are stable for DC with fu with urology on monday. Please hold aspirin for now until you have the foster removed and your hematuria has resolved. Your blood and urine cultures were negative     PRINCIPAL DISCHARGE DIAGNOSIS  Diagnosis: Hematuria  Assessment and Plan of Treatment: SP urolift and you developed hematuria. You are stable for DC with fu with urology on monday. Please hold aspirin for now until you have the foster removed and your hematuria has resolved. Your blood and urine cultures were negative however urology is recommending you complete a course of 5 more days of Ceftin

## 2025-02-07 NOTE — DISCHARGE NOTE PROVIDER - NSDCMRMEDTOKEN_GEN_ALL_CORE_FT
atorvastatin 20 mg oral tablet: 1 tab(s) orally once a day (in the evening)  finasteride 5 mg oral tablet: 1 tab(s) orally once a day (in the evening)  tamsulosin 0.4 mg oral capsule: 1 cap(s) orally once a day (in the evening)   atorvastatin 20 mg oral tablet: 1 tab(s) orally once a day (in the evening)  cefuroxime 250 mg oral tablet: 1 tab(s) orally every 12 hours  cefuroxime 500 mg oral tablet: 1 tab(s) orally 2 times a day  finasteride 5 mg oral tablet: 1 tab(s) orally once a day (in the evening)  tamsulosin 0.4 mg oral capsule: 1 cap(s) orally once a day (in the evening)   atorvastatin 20 mg oral tablet: 1 tab(s) orally once a day (in the evening)  cefuroxime 250 mg oral tablet: 1 tab(s) orally every 12 hours  finasteride 5 mg oral tablet: 1 tab(s) orally once a day (in the evening)  tamsulosin 0.4 mg oral capsule: 1 cap(s) orally once a day (in the evening)

## 2025-02-07 NOTE — DISCHARGE NOTE NURSING/CASE MANAGEMENT/SOCIAL WORK - FINANCIAL ASSISTANCE
[facility] provides services at a reduced cost to those who are determined to be eligible through [facility]’s financial assistance program. Information regarding [facility]’s financial assistance program can be found by going to [email or web address] or by calling [phone].

## 2025-02-07 NOTE — PROGRESS NOTE ADULT - SUBJECTIVE AND OBJECTIVE BOX
S: Patient seen and examined at bedside.  No acute overnight events, CBI slow overnight, did not require any manual irrigation.  Patient reports no new complaints at this time.  + Foster, ambulating and tolerating diet. Patient denies any fever, chills, chest pain, shortness of breath, nausea, vomiting, or urinary complaints.    MEDICATIONS:  acetaminophen     Tablet .. 650 milliGRAM(s) Oral every 6 hours PRN  aluminum hydroxide/magnesium hydroxide/simethicone Suspension 30 milliLiter(s) Oral every 4 hours PRN  aspirin enteric coated 81 milliGRAM(s) Oral daily  atorvastatin 20 milliGRAM(s) Oral at bedtime  cefTRIAXone Injectable. 1000 milliGRAM(s) IV Push every 24 hours  cholecalciferol 2000 Unit(s) Oral daily  finasteride 5 milliGRAM(s) Oral at bedtime  heparin   Injectable 5000 Unit(s) SubCutaneous every 12 hours  lactated ringers. 1000 milliLiter(s) IV Continuous <Continuous>  melatonin 3 milliGRAM(s) Oral at bedtime PRN  ondansetron Injectable 4 milliGRAM(s) IV Push every 8 hours PRN  tamsulosin 0.4 milliGRAM(s) Oral at bedtime      O:  Vital Signs Last 24 Hrs  T(C): 36.4 (07 Feb 2025 08:40), Max: 36.9 (06 Feb 2025 23:38)  T(F): 97.5 (07 Feb 2025 08:40), Max: 98.4 (06 Feb 2025 23:38)  HR: 65 (07 Feb 2025 08:40) (65 - 79)  BP: 112/62 (07 Feb 2025 08:40) (102/59 - 124/77)  RR: 18 (07 Feb 2025 08:40) (18 - 18)  SpO2: 98% (07 Feb 2025 08:40) (98% - 100%)    Parameters below as of 07 Feb 2025 08:40  Patient On (Oxygen Delivery Method): room air    PHYSICAL EXAM:  GENERAL: No acute distress, lying comfortably in bed  HEAD:  Atraumatic, Normocephalic  CHEST/LUNG: Non labored respirations, no accessory muscle use.   HEART: Regular rate and rhythm  ABDOMEN: Soft, non-tender, non-distended; no palpable bladder, no suprapubic tenderness  : + Indwelling Foster in place 3-way, CBI currently clamped. Draining clear yellow/tea colored urine.   EXT: calves non-tender b/l, no edema  NEUROLOGY: A&O x 3, no focal deficits    I&O SUMMARY:    02-06-25 @ 07:01  -  02-07-25 @ 07:00  --------------------------------------------------------  IN:    Continuous Bladder Irrigation (mL): 5500 mL  Total IN: 5500 mL    OUT:    Continuous Bladder Irrigation (mL): 60342 mL  Total OUT: 71829 mL    Total NET: -9750 mL          LABS:                        9.6    6.40  )-----------( 136      ( 07 Feb 2025 06:48 )             28.3     02-07    140  |  110[H]  |  16  ----------------------------<  92  3.8   |  26  |  0.86    Ca    9.5      07 Feb 2025 06:48  Phos  2.5     02-06  Mg     1.9     02-06    TPro  5.4[L]  /  Alb  3.0[L]  /  TBili  0.4  /  DBili  x   /  AST  15  /  ALT  26  /  AlkPhos  44  02-06    PT/INR - ( 05 Feb 2025 12:52 )   PT: 12.8 sec;   INR: 1.11 ratio         PTT - ( 05 Feb 2025 12:52 )  PTT:24.0 sec      Lactate, Blood: 1.8 mmol/L (02-05 @ 15:41)  Lactate, Blood: 2.7 mmol/L (02-05 @ 12:52)      ASSESSMENT: 75y/o Male admitted with hematuria/clot retention, s/p urolift with his urologist Dr. Keller 1/28/25. VSSAF, exam and labs with H/H stable. Foster cathter in place, CBI currently stopped.     PLAN:  - VSSAF, exam and labs reassuring  - Tolerating Reg diet  - No urologic contraindication to d/c today. D/c with foster catheter, patient to follow up with Dr. Keller in office for TOV. Rec d/c with ceftin x 5 days as patient has catheter.   - Pain control as needed  - Monitor intake and output of foster   - Rest of care per primary    Discussed with Dr. Nunez.

## 2025-02-07 NOTE — DISCHARGE NOTE NURSING/CASE MANAGEMENT/SOCIAL WORK - PATIENT PORTAL LINK FT
You can access the FollowMyHealth Patient Portal offered by [facility] by registering at the following website: [email or web address]. By joining [facility]’s FollowMyHealth portal, you will also be able to view your health information using other applications (apps) compatible with our system.

## 2025-02-07 NOTE — PROGRESS NOTE ADULT - NS ATTEND AMEND GEN_ALL_CORE FT
Patient was seen and examined by me, agree with above note, where necessary edits were made.     Recommended to follow-up with Dr. Keller next week for trial of void.
Patient was seen and examined by me, agree with above note, where necessary edits were made.

## 2025-02-10 LAB
CULTURE RESULTS: SIGNIFICANT CHANGE UP
CULTURE RESULTS: SIGNIFICANT CHANGE UP
SPECIMEN SOURCE: SIGNIFICANT CHANGE UP
SPECIMEN SOURCE: SIGNIFICANT CHANGE UP

## 2025-02-12 DIAGNOSIS — N13.8 OTHER OBSTRUCTIVE AND REFLUX UROPATHY: ICD-10-CM

## 2025-02-12 DIAGNOSIS — Z88.2 ALLERGY STATUS TO SULFONAMIDES: ICD-10-CM

## 2025-02-12 DIAGNOSIS — Z11.52 ENCOUNTER FOR SCREENING FOR COVID-19: ICD-10-CM

## 2025-02-12 DIAGNOSIS — N39.0 URINARY TRACT INFECTION, SITE NOT SPECIFIED: ICD-10-CM

## 2025-02-12 DIAGNOSIS — R31.9 HEMATURIA, UNSPECIFIED: ICD-10-CM

## 2025-02-12 DIAGNOSIS — N40.1 BENIGN PROSTATIC HYPERPLASIA WITH LOWER URINARY TRACT SYMPTOMS: ICD-10-CM

## 2025-02-12 DIAGNOSIS — E78.5 HYPERLIPIDEMIA, UNSPECIFIED: ICD-10-CM
